# Patient Record
Sex: FEMALE | Race: WHITE | Employment: FULL TIME | URBAN - METROPOLITAN AREA
[De-identification: names, ages, dates, MRNs, and addresses within clinical notes are randomized per-mention and may not be internally consistent; named-entity substitution may affect disease eponyms.]

---

## 2017-02-10 PROBLEM — R53.83 FATIGUE, UNSPECIFIED TYPE: Status: ACTIVE | Noted: 2017-02-10

## 2017-02-10 PROBLEM — J45.30 MILD PERSISTENT ASTHMA WITHOUT COMPLICATION: Status: ACTIVE | Noted: 2017-02-10

## 2017-02-10 PROBLEM — E03.9 HYPOTHYROIDISM, UNSPECIFIED TYPE: Status: ACTIVE | Noted: 2017-02-10

## 2017-02-10 PROBLEM — E66.3 OVERWEIGHT: Status: ACTIVE | Noted: 2017-02-10

## 2017-07-24 PROBLEM — E78.5 DYSLIPIDEMIA: Status: ACTIVE | Noted: 2017-07-24

## 2017-07-24 PROBLEM — E55.9 VITAMIN D DEFICIENCY: Status: ACTIVE | Noted: 2017-07-24

## 2017-11-28 PROCEDURE — 87086 URINE CULTURE/COLONY COUNT: CPT | Performed by: OBSTETRICS & GYNECOLOGY

## 2017-11-28 PROCEDURE — 81001 URINALYSIS AUTO W/SCOPE: CPT | Performed by: OBSTETRICS & GYNECOLOGY

## 2018-07-16 PROBLEM — R04.2 BLOOD-TINGED SPUTUM: Status: ACTIVE | Noted: 2018-07-16

## 2018-07-16 PROBLEM — J20.9 ACUTE BRONCHITIS, UNSPECIFIED ORGANISM: Status: ACTIVE | Noted: 2018-07-16

## 2018-07-16 PROBLEM — R05.9 COUGH: Status: ACTIVE | Noted: 2018-07-16

## 2018-07-16 PROCEDURE — 86664 EPSTEIN-BARR NUCLEAR ANTIGEN: CPT | Performed by: FAMILY MEDICINE

## 2018-07-16 PROCEDURE — 86663 EPSTEIN-BARR ANTIBODY: CPT | Performed by: FAMILY MEDICINE

## 2018-07-16 PROCEDURE — 86665 EPSTEIN-BARR CAPSID VCA: CPT | Performed by: FAMILY MEDICINE

## 2018-11-30 PROCEDURE — 88175 CYTOPATH C/V AUTO FLUID REDO: CPT | Performed by: OBSTETRICS & GYNECOLOGY

## 2019-04-17 PROBLEM — J20.9 ACUTE BRONCHITIS, UNSPECIFIED ORGANISM: Status: RESOLVED | Noted: 2018-07-16 | Resolved: 2019-04-17

## 2019-04-17 PROBLEM — N92.1 MENORRHAGIA WITH IRREGULAR CYCLE: Status: ACTIVE | Noted: 2019-04-17

## 2019-04-17 PROBLEM — R04.2 BLOOD-TINGED SPUTUM: Status: RESOLVED | Noted: 2018-07-16 | Resolved: 2019-04-17

## 2019-04-17 PROBLEM — E66.09 CLASS 2 OBESITY DUE TO EXCESS CALORIES WITHOUT SERIOUS COMORBIDITY WITH BODY MASS INDEX (BMI) OF 38.0 TO 38.9 IN ADULT: Status: ACTIVE | Noted: 2019-04-17

## 2019-04-17 PROBLEM — R05.9 COUGH: Status: RESOLVED | Noted: 2018-07-16 | Resolved: 2019-04-17

## 2019-05-11 PROCEDURE — 81001 URINALYSIS AUTO W/SCOPE: CPT | Performed by: FAMILY MEDICINE

## 2019-07-08 PROBLEM — E07.9 THYROID DISEASE DURING PREGNANCY: Status: ACTIVE | Noted: 2019-07-08

## 2019-07-08 PROBLEM — E06.3 HYPOTHYROIDISM DUE TO HASHIMOTO'S THYROIDITIS: Status: ACTIVE | Noted: 2019-07-08

## 2019-07-08 PROBLEM — O99.280 THYROID DISEASE DURING PREGNANCY: Status: ACTIVE | Noted: 2019-07-08

## 2019-07-08 PROBLEM — E03.8 HYPOTHYROIDISM DUE TO HASHIMOTO'S THYROIDITIS: Status: ACTIVE | Noted: 2019-07-08

## 2019-09-06 PROCEDURE — 86901 BLOOD TYPING SEROLOGIC RH(D): CPT | Performed by: OBSTETRICS & GYNECOLOGY

## 2019-09-06 PROCEDURE — 86850 RBC ANTIBODY SCREEN: CPT | Performed by: OBSTETRICS & GYNECOLOGY

## 2019-09-06 PROCEDURE — 86900 BLOOD TYPING SEROLOGIC ABO: CPT | Performed by: OBSTETRICS & GYNECOLOGY

## 2019-09-15 PROCEDURE — 87086 URINE CULTURE/COLONY COUNT: CPT | Performed by: EMERGENCY MEDICINE

## 2019-10-14 PROBLEM — Z34.90 PREGNANCY: Status: ACTIVE | Noted: 2019-10-14

## 2019-10-28 ENCOUNTER — TELEPHONE (OUTPATIENT)
Dept: PERINATAL CARE | Facility: HOSPITAL | Age: 29
End: 2019-10-28

## 2019-10-30 NOTE — PROGRESS NOTES
Outpatient Maternal-Fetal Medicine Consultation    Dear Dr. Rosendo Abernathy,    Thank you for requesting ultrasound evaluation and maternal fetal medicine consultation on your patient Galileo Bailey.   As you are aware she is a 34year old female with a [place] pregna Wt (!) 334 lb (151.5 kg)   LMP 05/09/2018 (Exact Date)   BMI 53.91 kg/m²   General: alert and oriented,no acute distress  Abdomen: gravid, soft, non-tender  Extremities: non-tender, no edema    OBSTETRIC ULTRASOUND  The patient had a level II ultrasound to suboptimal  Heart: Visualized and normal appearance: cardiac location, left outflow tract, Ductal arch. Four-chamber view: suboptimal, appears normal but suboptimal views of the pulmonary veins.  Right outflow tract: suboptimal, appears normal. Aortic arc population, type 2 diabetes mellitus is one of the two most common medical complications of the obese . The increased risk of type 2 diabetes is primarily related to an exaggerated increase in insulin resistance in the obese state.  It is reasonable II ultrasound is advised for women with obesity. The risk of neural tube defects increased significantly with maternal weight.     The analysis found that overweight and obese pregnant women experienced significantly more stillbirths than normal weight wom range with low free thyroxine [T4]) should be treated with thyroid hormone (T4).  In addition, because maternal euthyroidism is potentially important for normal fetal cognitive development, it is suggested to treat pregnant women with subclinical hypothyroi echo  Weekly NST's at 36 weeks. · She was advised to limit gestational weight gain to between 5 and 15 pounds  · Monitor TSH each trimester  · Referral for a sleep study is advised based on her BMI. She denies any snoring.     Thank you for allowing me to

## 2019-11-06 ENCOUNTER — OFFICE VISIT (OUTPATIENT)
Dept: PERINATAL CARE | Facility: HOSPITAL | Age: 29
End: 2019-11-06
Attending: OBSTETRICS & GYNECOLOGY
Payer: COMMERCIAL

## 2019-11-06 VITALS
BODY MASS INDEX: 47.09 KG/M2 | RESPIRATION RATE: 20 BRPM | SYSTOLIC BLOOD PRESSURE: 129 MMHG | WEIGHT: 293 LBS | DIASTOLIC BLOOD PRESSURE: 88 MMHG | HEART RATE: 92 BPM | HEIGHT: 66 IN

## 2019-11-06 DIAGNOSIS — E03.8 HYPOTHYROIDISM DUE TO HASHIMOTO'S THYROIDITIS: ICD-10-CM

## 2019-11-06 DIAGNOSIS — E06.3 HYPOTHYROIDISM DUE TO HASHIMOTO'S THYROIDITIS: ICD-10-CM

## 2019-11-06 DIAGNOSIS — O09.612 HIGH-RISK PREGNANCY, YOUNG PRIMIGRAVIDA IN SECOND TRIMESTER: ICD-10-CM

## 2019-11-06 PROCEDURE — 76811 OB US DETAILED SNGL FETUS: CPT | Performed by: OBSTETRICS & GYNECOLOGY

## 2019-11-06 PROCEDURE — 99244 OFF/OP CNSLTJ NEW/EST MOD 40: CPT | Performed by: OBSTETRICS & GYNECOLOGY

## 2019-11-06 NOTE — PROGRESS NOTES
Pt seen in [facility] at 24 4/7 weeks gestation, ambulatory and accompanied by her father Glenn Spicer. (FOB at work). Pt reports + FM, Denies concerns or discomforts.

## 2019-12-09 ENCOUNTER — OFFICE VISIT (OUTPATIENT)
Dept: PERINATAL CARE | Facility: HOSPITAL | Age: 29
End: 2019-12-09
Attending: OBSTETRICS & GYNECOLOGY
Payer: COMMERCIAL

## 2019-12-09 VITALS
DIASTOLIC BLOOD PRESSURE: 70 MMHG | BODY MASS INDEX: 54 KG/M2 | HEART RATE: 109 BPM | WEIGHT: 293 LBS | SYSTOLIC BLOOD PRESSURE: 140 MMHG

## 2019-12-09 DIAGNOSIS — Z3A.29 29 WEEKS GESTATION OF PREGNANCY: ICD-10-CM

## 2019-12-09 DIAGNOSIS — E03.8 HYPOTHYROIDISM DUE TO HASHIMOTO'S THYROIDITIS: ICD-10-CM

## 2019-12-09 DIAGNOSIS — E06.3 HYPOTHYROIDISM DUE TO HASHIMOTO'S THYROIDITIS: ICD-10-CM

## 2019-12-09 PROCEDURE — 76827 ECHO EXAM OF FETAL HEART: CPT | Performed by: OBSTETRICS & GYNECOLOGY

## 2019-12-09 PROCEDURE — 76816 OB US FOLLOW-UP PER FETUS: CPT | Performed by: OBSTETRICS & GYNECOLOGY

## 2019-12-09 PROCEDURE — 76825 ECHO EXAM OF FETAL HEART: CPT | Performed by: OBSTETRICS & GYNECOLOGY

## 2019-12-09 PROCEDURE — 93325 DOPPLER ECHO COLOR FLOW MAPG: CPT | Performed by: OBSTETRICS & GYNECOLOGY

## 2019-12-09 PROCEDURE — 99214 OFFICE O/P EST MOD 30 MIN: CPT | Performed by: OBSTETRICS & GYNECOLOGY

## 2019-12-09 NOTE — PROGRESS NOTES
Indication: poor visualization anatomy. ____________________________________________________________________________  History: Age: 34 years.  : 1 Para: 0.  ____________________________________________________________________________  Dating:  LMP: arteries,:  Confluent, normal size  Ductal Arch:  suboptimal views  Aortic arch:  Normal  Rhythm:  Sinus    ____________________________________________________________________________  Comments:    Dear Dr. Lizette Lion,       Thank you for requesting  an Remy Mack undiagnosed pregestational diabetes in the first trimester.    Glucose intolerance associated with gestational diabetes generally resolves postpartum; however, obese women with a history of gestational diabetes have a two-fold increased prevalence of subseq kg)   LMP 05/09/2018 (Exact Date)   BMI 54.23 kg/m²   Alert and Oriented. No acute distress. Abdomen: soft, nontender      Ultrasound findings: The fetal measurements are consistent with the established EDC.   She understands that ultrasound exam canno

## 2020-01-06 ENCOUNTER — OFFICE VISIT (OUTPATIENT)
Dept: PERINATAL CARE | Facility: HOSPITAL | Age: 30
End: 2020-01-06
Attending: OBSTETRICS & GYNECOLOGY
Payer: COMMERCIAL

## 2020-01-06 VITALS
WEIGHT: 293 LBS | HEART RATE: 107 BPM | DIASTOLIC BLOOD PRESSURE: 64 MMHG | SYSTOLIC BLOOD PRESSURE: 148 MMHG | BODY MASS INDEX: 56 KG/M2

## 2020-01-06 DIAGNOSIS — O99.213 OBESITY AFFECTING PREGNANCY IN THIRD TRIMESTER: ICD-10-CM

## 2020-01-06 PROCEDURE — 76819 FETAL BIOPHYS PROFIL W/O NST: CPT | Performed by: OBSTETRICS & GYNECOLOGY

## 2020-01-06 PROCEDURE — 76816 OB US FOLLOW-UP PER FETUS: CPT | Performed by: OBSTETRICS & GYNECOLOGY

## 2020-01-06 PROCEDURE — 99213 OFFICE O/P EST LOW 20 MIN: CPT | Performed by: OBSTETRICS & GYNECOLOGY

## 2020-01-06 NOTE — PROGRESS NOTES
Indication: poor visualization anatomy. ____________________________________________________________________________  History: Age: 34 years.  : 1 Para: 0.  ____________________________________________________________________________  Dating:  LM Thank you for requesting  an ultrasound and consultation for   Criselda Borden   regarding obesity and hypothyroidism. Her prenatal records and labs were reviewed.           G1  Allergy - codeine, sulfa  PMH -  morbid obesity, asthma, hypothyroidism     PSH

## 2020-06-07 ENCOUNTER — HOSPITAL ENCOUNTER (EMERGENCY)
Facility: HOSPITAL | Age: 30
Discharge: HOME | End: 2020-06-07
Attending: EMERGENCY MEDICINE
Payer: COMMERCIAL

## 2020-06-07 ENCOUNTER — APPOINTMENT (OUTPATIENT)
Dept: GENERAL RADIOLOGY | Facility: HOSPITAL | Age: 30
End: 2020-06-07
Attending: EMERGENCY MEDICINE
Payer: COMMERCIAL

## 2020-06-07 VITALS
SYSTOLIC BLOOD PRESSURE: 110 MMHG | WEIGHT: 293 LBS | DIASTOLIC BLOOD PRESSURE: 42 MMHG | TEMPERATURE: 99 F | RESPIRATION RATE: 23 BRPM | BODY MASS INDEX: 48 KG/M2 | HEART RATE: 96 BPM | OXYGEN SATURATION: 100 %

## 2020-06-07 DIAGNOSIS — U07.1 COVID-19: Primary | ICD-10-CM

## 2020-06-07 PROCEDURE — 99285 EMERGENCY DEPT VISIT HI MDM: CPT

## 2020-06-07 PROCEDURE — 82728 ASSAY OF FERRITIN: CPT | Performed by: EMERGENCY MEDICINE

## 2020-06-07 PROCEDURE — 93005 ELECTROCARDIOGRAM TRACING: CPT

## 2020-06-07 PROCEDURE — 80053 COMPREHEN METABOLIC PANEL: CPT | Performed by: EMERGENCY MEDICINE

## 2020-06-07 PROCEDURE — 93010 ELECTROCARDIOGRAM REPORT: CPT

## 2020-06-07 PROCEDURE — 85025 COMPLETE CBC W/AUTO DIFF WBC: CPT | Performed by: EMERGENCY MEDICINE

## 2020-06-07 PROCEDURE — 84145 PROCALCITONIN (PCT): CPT | Performed by: EMERGENCY MEDICINE

## 2020-06-07 PROCEDURE — 96360 HYDRATION IV INFUSION INIT: CPT

## 2020-06-07 PROCEDURE — 85379 FIBRIN DEGRADATION QUANT: CPT | Performed by: EMERGENCY MEDICINE

## 2020-06-07 PROCEDURE — 71045 X-RAY EXAM CHEST 1 VIEW: CPT | Performed by: EMERGENCY MEDICINE

## 2020-06-07 PROCEDURE — 83615 LACTATE (LD) (LDH) ENZYME: CPT | Performed by: EMERGENCY MEDICINE

## 2020-06-07 PROCEDURE — 84484 ASSAY OF TROPONIN QUANT: CPT | Performed by: EMERGENCY MEDICINE

## 2020-06-07 RX ORDER — POTASSIUM CHLORIDE 20 MEQ/1
40 TABLET, EXTENDED RELEASE ORAL ONCE
Status: COMPLETED | OUTPATIENT
Start: 2020-06-07 | End: 2020-06-07

## 2020-06-07 NOTE — ED INITIAL ASSESSMENT (HPI)
Per patient, diagnosed with COVID almost 2 weeks ago and cough with shortness of breath is worse. AAO x 4.

## 2020-06-07 NOTE — ED PROVIDER NOTES
Patient Seen in: [facility] Emergency Department      History   Patient presents with:   Other    Stated Complaint: POSITIVE COVID AND SYMPTOMS ARE GETTING WORSE    HPI    The patient is a 46-year-old female with a history of asthma, works at [employer] systems are as noted in HPI. Constitutional and vital signs reviewed. All other systems reviewed and negative except as noted above.     Physical Exam     ED Triage Vitals [06/07/20 1106]   BP 97/52   Pulse 101   Resp (!) 28   Temp 98.6 °F (37 °C)   T Reviewed   COMP METABOLIC PANEL (14) - Abnormal; Notable for the following components:       Result Value    Potassium 3.2 (*)     Calcium, Total 7.8 (*)     AST 38 (*)     ALT 69 (*)     Albumin 3.0 (*)     A/G Ratio 0.7 (*)     All other components withi COMPARISON:  None. INDICATIONS:  POSITIVE COVID AND SYMPTOMS ARE GETTING WORSE         PATIENT STATED HISTORY: (As transcribed by Technologist)  COVID symptoms     getting worse. Asthma getting worse. SOB getting worse since yesterday. she is to follow-up with her physician as previously planned tomorrow. She felt comfortable with this plan and she is discharged home in stable condition.             Disposition and Plan     Clinical Impression:  EQUED-38  (primary encounter diagnosis)

## 2020-06-10 ENCOUNTER — HOSPITAL ENCOUNTER (INPATIENT)
Facility: HOSPITAL | Age: 30
LOS: 2 days | Discharge: HOME | DRG: 177 | End: 2020-06-12
Attending: EMERGENCY MEDICINE | Admitting: HOSPITALIST
Payer: COMMERCIAL

## 2020-06-10 ENCOUNTER — APPOINTMENT (OUTPATIENT)
Dept: GENERAL RADIOLOGY | Facility: HOSPITAL | Age: 30
DRG: 177 | End: 2020-06-10
Attending: EMERGENCY MEDICINE
Payer: COMMERCIAL

## 2020-06-10 DIAGNOSIS — J45.30 MILD PERSISTENT ASTHMA WITHOUT COMPLICATION: ICD-10-CM

## 2020-06-10 DIAGNOSIS — R09.02 HYPOXIA: ICD-10-CM

## 2020-06-10 DIAGNOSIS — U07.1 COVID-19 VIRUS INFECTION: Primary | ICD-10-CM

## 2020-06-10 PROCEDURE — 82728 ASSAY OF FERRITIN: CPT | Performed by: EMERGENCY MEDICINE

## 2020-06-10 PROCEDURE — 93010 ELECTROCARDIOGRAM REPORT: CPT | Performed by: INTERNAL MEDICINE

## 2020-06-10 PROCEDURE — 83880 ASSAY OF NATRIURETIC PEPTIDE: CPT | Performed by: EMERGENCY MEDICINE

## 2020-06-10 PROCEDURE — 93005 ELECTROCARDIOGRAM TRACING: CPT

## 2020-06-10 PROCEDURE — 86140 C-REACTIVE PROTEIN: CPT | Performed by: EMERGENCY MEDICINE

## 2020-06-10 PROCEDURE — 99285 EMERGENCY DEPT VISIT HI MDM: CPT

## 2020-06-10 PROCEDURE — 85379 FIBRIN DEGRADATION QUANT: CPT | Performed by: EMERGENCY MEDICINE

## 2020-06-10 PROCEDURE — 84145 PROCALCITONIN (PCT): CPT | Performed by: EMERGENCY MEDICINE

## 2020-06-10 PROCEDURE — 80053 COMPREHEN METABOLIC PANEL: CPT | Performed by: EMERGENCY MEDICINE

## 2020-06-10 PROCEDURE — 87040 BLOOD CULTURE FOR BACTERIA: CPT | Performed by: EMERGENCY MEDICINE

## 2020-06-10 PROCEDURE — 36415 COLL VENOUS BLD VENIPUNCTURE: CPT

## 2020-06-10 PROCEDURE — 85025 COMPLETE CBC W/AUTO DIFF WBC: CPT | Performed by: EMERGENCY MEDICINE

## 2020-06-10 PROCEDURE — 84484 ASSAY OF TROPONIN QUANT: CPT | Performed by: EMERGENCY MEDICINE

## 2020-06-10 PROCEDURE — 71045 X-RAY EXAM CHEST 1 VIEW: CPT | Performed by: EMERGENCY MEDICINE

## 2020-06-10 PROCEDURE — 83615 LACTATE (LD) (LDH) ENZYME: CPT | Performed by: EMERGENCY MEDICINE

## 2020-06-10 RX ORDER — METOCLOPRAMIDE HYDROCHLORIDE 5 MG/ML
10 INJECTION INTRAMUSCULAR; INTRAVENOUS EVERY 8 HOURS PRN
Status: DISCONTINUED | OUTPATIENT
Start: 2020-06-10 | End: 2020-06-12

## 2020-06-10 RX ORDER — ACETAMINOPHEN AND CODEINE PHOSPHATE 120; 12 MG/5ML; MG/5ML
0.35 SOLUTION ORAL DAILY
Status: DISCONTINUED | OUTPATIENT
Start: 2020-06-10 | End: 2020-06-11

## 2020-06-10 RX ORDER — MONTELUKAST SODIUM 10 MG/1
10 TABLET ORAL NIGHTLY
Status: DISCONTINUED | OUTPATIENT
Start: 2020-06-10 | End: 2020-06-12

## 2020-06-10 RX ORDER — ENOXAPARIN SODIUM 100 MG/ML
0.5 INJECTION SUBCUTANEOUS DAILY
Status: DISCONTINUED | OUTPATIENT
Start: 2020-06-10 | End: 2020-06-12

## 2020-06-10 RX ORDER — BISACODYL 10 MG
10 SUPPOSITORY, RECTAL RECTAL
Status: DISCONTINUED | OUTPATIENT
Start: 2020-06-10 | End: 2020-06-12

## 2020-06-10 RX ORDER — ALBUTEROL SULFATE 90 UG/1
2 AEROSOL, METERED RESPIRATORY (INHALATION) EVERY 4 HOURS PRN
Status: DISCONTINUED | OUTPATIENT
Start: 2020-06-10 | End: 2020-06-12

## 2020-06-10 RX ORDER — LEVOTHYROXINE SODIUM 0.2 MG/1
200 TABLET ORAL
Status: DISCONTINUED | OUTPATIENT
Start: 2020-06-10 | End: 2020-06-11

## 2020-06-10 RX ORDER — BENZONATATE 200 MG/1
200 CAPSULE ORAL 3 TIMES DAILY PRN
Status: DISCONTINUED | OUTPATIENT
Start: 2020-06-10 | End: 2020-06-11

## 2020-06-10 RX ORDER — ACETAMINOPHEN 325 MG/1
650 TABLET ORAL EVERY 6 HOURS PRN
Status: DISCONTINUED | OUTPATIENT
Start: 2020-06-10 | End: 2020-06-12

## 2020-06-10 RX ORDER — SODIUM PHOSPHATE, DIBASIC AND SODIUM PHOSPHATE, MONOBASIC 7; 19 G/133ML; G/133ML
1 ENEMA RECTAL ONCE AS NEEDED
Status: DISCONTINUED | OUTPATIENT
Start: 2020-06-10 | End: 2020-06-12

## 2020-06-10 RX ORDER — ONDANSETRON 2 MG/ML
4 INJECTION INTRAMUSCULAR; INTRAVENOUS EVERY 6 HOURS PRN
Status: DISCONTINUED | OUTPATIENT
Start: 2020-06-10 | End: 2020-06-12

## 2020-06-10 RX ORDER — PREDNISONE 20 MG/1
20 TABLET ORAL DAILY
Status: DISCONTINUED | OUTPATIENT
Start: 2020-06-11 | End: 2020-06-11

## 2020-06-10 RX ORDER — POLYETHYLENE GLYCOL 3350 17 G/17G
17 POWDER, FOR SOLUTION ORAL DAILY PRN
Status: DISCONTINUED | OUTPATIENT
Start: 2020-06-10 | End: 2020-06-12

## 2020-06-10 RX ORDER — LEVOTHYROXINE SODIUM 0.07 MG/1
75 TABLET ORAL
Status: DISCONTINUED | OUTPATIENT
Start: 2020-06-10 | End: 2020-06-11

## 2020-06-10 NOTE — ED PROVIDER NOTES
Patient Seen in: [facility] Emergency Department      History   Patient presents with:  Dyspnea PAUL SOB    Stated Complaint: Covid positive 12 days ago    HPI    Patient is a 17-year-old female who returns for shortness of breath.   She is a known as Wt (!) 137.2 kg   LMP 06/01/2020   SpO2 96%   BMI 48.81 kg/m²         Physical Exam      Constitutional: Awake, alert, age appearing, non-toxic  Head: Normocephalic and atraumatic. Eyes: EOM are normal. Pupils are equal, round, and reactive to light. BY PCR - Normal   CBC WITH DIFFERENTIAL WITH PLATELET    Narrative: The following orders were created for panel order CBC WITH DIFFERENTIAL WITH PLATELET.   Procedure                               Abnormality         Status                     ---------

## 2020-06-10 NOTE — ED INITIAL ASSESSMENT (HPI)
Pt reports since Sunday night noted shortness of breath that is worse w/ ambulation, reports this am she slept through the night and didn't wake up to take neb treatment so O2 saturation was 86%.      COVID positive x12 days; hx of asthma

## 2020-06-11 PROCEDURE — 86140 C-REACTIVE PROTEIN: CPT | Performed by: HOSPITALIST

## 2020-06-11 PROCEDURE — 94640 AIRWAY INHALATION TREATMENT: CPT

## 2020-06-11 PROCEDURE — 85025 COMPLETE CBC W/AUTO DIFF WBC: CPT | Performed by: HOSPITALIST

## 2020-06-11 PROCEDURE — 87493 C DIFF AMPLIFIED PROBE: CPT | Performed by: HOSPITALIST

## 2020-06-11 PROCEDURE — 83615 LACTATE (LD) (LDH) ENZYME: CPT | Performed by: HOSPITALIST

## 2020-06-11 PROCEDURE — 80053 COMPREHEN METABOLIC PANEL: CPT | Performed by: HOSPITALIST

## 2020-06-11 RX ORDER — LEVOTHYROXINE SODIUM 0.2 MG/1
200 TABLET ORAL NIGHTLY
Status: DISCONTINUED | OUTPATIENT
Start: 2020-06-11 | End: 2020-06-12

## 2020-06-11 RX ORDER — LEVOTHYROXINE SODIUM 0.07 MG/1
75 TABLET ORAL NIGHTLY
Status: DISCONTINUED | OUTPATIENT
Start: 2020-06-11 | End: 2020-06-12

## 2020-06-11 RX ORDER — ACETAMINOPHEN AND CODEINE PHOSPHATE 120; 12 MG/5ML; MG/5ML
0.35 SOLUTION ORAL NIGHTLY
Status: DISCONTINUED | OUTPATIENT
Start: 2020-06-11 | End: 2020-06-12

## 2020-06-11 RX ORDER — GUAIFENESIN 600 MG
600 TABLET, EXTENDED RELEASE 12 HR ORAL 2 TIMES DAILY
Status: DISCONTINUED | OUTPATIENT
Start: 2020-06-11 | End: 2020-06-12

## 2020-06-11 RX ORDER — BENZONATATE 200 MG/1
200 CAPSULE ORAL 3 TIMES DAILY
Status: DISCONTINUED | OUTPATIENT
Start: 2020-06-11 | End: 2020-06-12

## 2020-06-11 NOTE — DIETARY NOTE
[address]     Admitting diagnosis:  Hypoxia [R09.02]  COVID-19 virus infection [U07.1]    Ht: 167.6 cm (5' 6\")  Wt: (!) 136.4 kg (300 lb 12.8 oz). This is 231% of IBW  Body mass index is 48.55 kg/m².   IBW: 59 kg

## 2020-06-11 NOTE — H&P
[facility] Hospitalist H&P       CC: sob, hypoxia    PCP: Lucita Gamboa MD    History of Present Illness: Pt is a 33 yo with asthma, Hashimoto's thyroiditis, morbid obesity, recent diagnosis of sars-cov-2 end of May, is admitted for sob, hypoxia.   A to 6 hours as needed for Wheezing., Disp: 8.5 g, Rfl: 1  cefdinir 300 MG Oral Cap, Take 1 capsule (300 mg total) by mouth 2 (two) times daily. , Disp: 20 capsule, Rfl: 0  benzonatate 200 MG Oral Cap, Take 1 capsule (200 mg total) by mouth 3 (three) times da no overt hernias   Extremities: Extremities normal, atraumatic, no cyanosis or edema. Skin: Skin color, texture, turgor normal. No visible rashes.      Neurologic:  Psychiatric: No focal deficits  appropriate affect,  memory intact     Diagnostic Data: ddimer and trop unremarkable. . Pct unremarkable. BC pending  -on empiric abx thought doubt superimposed bacterial pneumonia given unremarkable pct  -pulmonary consult, appreciate    **asthma- was taking nebs and po prednisone as outpatient.   On MDI

## 2020-06-11 NOTE — CONSULTS
Pulmonary H&P/Consult       NAME: Jayde Sandra - ROOM: [address] [place] [address] MRN: [id number] - Age: 34year old - :  12/3/1990    Date of Admission: 6/10/2020  3:02 PM  Admission Diagnosis: Hypoxia [R09.02]  COVID-19 virus infection [U07.1]  Reason for consult: d time  predniSONE 20 MG Oral Tab, Take 1 tablet (20 mg total) by mouth daily for 7 days. , Disp: 7 tablet, Rfl: 0, 6/10/2020 at Unknown time  Albuterol Sulfate HFA (PROAIR HFA) 108 (90 Base) MCG/ACT Inhalation Aero Soln, Inhale 2 puffs into the lungs every 4 Financial resource strain: Not on file      Food insecurity:        Worry: Not on file        Inability: Not on file      Transportation needs:        Medical: Not on file        Non-medical: Not on file    Tobacco Use      Smoking status: Never Smoker by mouth 2 (two) times daily. , Disp: 20 capsule, Rfl: 0  benzonatate 200 MG Oral Cap, Take 1 capsule (200 mg total) by mouth 3 (three) times daily as needed for cough. , Disp: 30 capsule, Rfl: 0  Montelukast Sodium 10 MG Oral Tab, Take 1 tablet (10 mg total °C) 97.5 °F (36.4 °C)   TempSrc:  Oral Oral Oral   SpO2:  96% 95% 99%   Weight:   (!) 300 lb 12.8 oz (136.4 kg)    Height:           Oxygen Therapy  SpO2: 99 %  O2 Device: None (Room air)  Pulse Oximetry Type: Continuous  Oximetry Probe Site Changed:  Yes asthma, covid pna  -monitor sats  -IS to bedside  2. Asthma  -cont MDI  -start Breo  -cont singulair  -stop steroids  3.  Covid PNA  -not requiring O2 at rest  -hold on actemra  -outside the window for remdesivir  -cont to self prone  -encouraged IS  -ok to

## 2020-06-11 NOTE — PROGRESS NOTES
Pt just arrived from [facility], she is alert, oriented x4, very pleasant. RA.C/o SOB, denies pain.  Report given to next shift

## 2020-06-11 NOTE — PLAN OF CARE
Problem: Patient/Family Goals  Goal: Patient/Family Long Term Goal  Description  Patient's Long Term Goal: to discharge with adequate resources    Interventions:  - Participate in and comply with medical treatment plan  - See additional Care Plan goals f Manage/alleviate anxiety  - Monitor for signs/symptoms of CO2 retention  Outcome: Progressing     Problem: GASTROINTESTINAL - ADULT  Goal: Minimal or absence of nausea and vomiting  Description  INTERVENTIONS:  - Maintain adequate hydration with IV or PO a breathe and IS encouraged. Pt reports frequent use of inhalers at home, respiratory mentioned possible benefit from nebs. Notified MD of respiratory recommendation and advised to wait for further eval tomorrow. Afebrile, VSS, NSR/SB on tele.   Refused lo

## 2020-06-11 NOTE — PROGRESS NOTES
NURSING ADMISSION NOTE      Patient admitted via Cart  Oriented to room. Safety precautions initiated. Bed in low position. Call light in reach. Pt oriented to room, admission navigator completed.

## 2020-06-11 NOTE — PROGRESS NOTES
[facility] Pharmacy Note:  Anticoagulation Weight Dose Adjustment for enoxaparin (LOVENOX)    Matt Shaikh is a 34year old female who has been prescribed enoxaparin (LOVENOX) 40 mg every 24 hours.       Estimated Creatinine Clearance: 114.6 mL/min (based on SCr

## 2020-06-11 NOTE — PAYOR COMM NOTE
--------------  ADMISSION REVIEW     Payor: [employer] [place] [employer]  Subscriber #:  [id number]  Authorization Number: [id number]    Admit date: 6/10/20  Admit time: [address]       Admitting Physician: Joann Horne MD  Attending Physician:  Maddison Gupta Alcohol use: Never      Alcohol/week: 0.0 standard drinks      Frequency: Never      Comment: Social    Drug use: No             Review of Systems    Positive for stated complaint: Covid positive 12 days ago  Other systems are as noted in HPI.   Constitut All other components within normal limits   PRO BETA NATRIURETIC PEPTIDE - Abnormal; Notable for the following components:    Pro-Beta Natriuretic Peptide 287 (*)     All other components within normal limits   RBC MORPHOLOGY SCAN - Abnormal; Notable for COVID-19 virus infection  (primary encounter diagnosis)  Hypoxia    Disposition:  Admit  6/10/2020  4:37 pm    Follow-up:  No follow-up provider specified.         Medications Prescribed:  Current Discharge Medication List                       Present on A gestational   • Psoriasis         PSH  Past Surgical History:   Procedure Laterality Date   •      • OTHER SURGICAL HISTORY  2020     Section        ALL:    Other                   SWELLING    Comment:steel  Codeine History reviewed. No pertinent family history. Review of Systems  Comprehensive ROS reviewed and negative except for what's stated above. Including negative for abdominal pain, syncope.        OBJECTIVE:  /83 (BP Location: Left arm)   Pulse 62   T 1204 06/10/20  1657   TROP <0.045 <0.045 <0.045       Additional Diagnostics: ECG: NSR HR 78    CXR: image personally reviewed agree with [id number] read    Radiology: Xr Chest Pa + Lat Chest (cpt=71046)              Xr Chest Ap Portable  (cpt=71045) Author: Alessandro Elizabeth MD Service: Pulmonology Author Type: Physician   Filed: 6/11/2020 12:00 PM Date of Service: 6/11/2020 11:31 AM Status: Signed   : Alessandro Elizabeth MD (Physician)   Consult Orders   1.  Consult to Pulmonology [[id number]    Section      sodium Chloride 0.9 % Inhalation Nebu Soln, MIX WITH LEVALBUTEROL AND NEBULIZER Q 4 H UTD, Disp: , Rfl: , 6/10/2020 at Unknown time  Levalbuterol HCl 1.25 MG/3ML Inhalation Nebu Soln, Take 3 mL (1.25 mg total) by nebulization every Adhesive Tape           RASH    Comment:Takes skin off     Social History:  Social History    Socioeconomic History      Marital status: Single      Spouse name: Not on file      Number of children: Not on file      Years of education: Not on file      Hig Levalbuterol HCl 1.25 MG/3ML Inhalation Nebu Soln, Take 3 mL (1.25 mg total) by nebulization every 4 (four) hours as needed for Wheezing., Disp: 30 each, Rfl: 1  predniSONE 20 MG Oral Tab, Take 1 tablet (20 mg total) by mouth daily for 7 days. , Disp: 7 tab CARDIOVASCULAR:  denies current chest pain   GI:  denies abdominal pain  :  denies dysuria or changes in stream   MUSCULOSKELETAL:  denies back pain   NEURO:  denies headaches, no strokes or seizure history   PSYCHE:  denies depression or anxiety   HEMAT Heart:    Regular rate and rhythm, S1 and S2 normal, no murmur, rub   or gallop   Abdomen:     Soft, non-tender, bowel sounds active all four quadrants,     no masses, no organomegaly   Extremities:   Extremities normal, atraumatic, no cyanosis or edema

## 2020-06-11 NOTE — PLAN OF CARE
Pt is AOx4. On room air, . Tele, NSR. VSS, afebrile. Up independently. IS encouraged, max 1500. Scheduled tessalon. Pt stating breathing feels improved after first dose of Breo. Pt updated on plan of care. Will continue to monitor.      Problem: Patient/ Encourage broncho-pulmonary hygiene including cough, deep breathe, Incentive Spirometry  - Assess the need for suctioning and perform as needed  - Assess and instruct to report SOB or any respiratory difficulty  - Respiratory Therapy support as indicated Monitor I&O, WT and lab values  - Obtain nutritional consult as needed  - Evaluate psychosocial factors contributing to over-consumption  Outcome: Progressing

## 2020-06-12 VITALS
BODY MASS INDEX: 47.09 KG/M2 | HEART RATE: 76 BPM | WEIGHT: 293 LBS | SYSTOLIC BLOOD PRESSURE: 127 MMHG | DIASTOLIC BLOOD PRESSURE: 75 MMHG | HEIGHT: 66 IN | RESPIRATION RATE: 20 BRPM | TEMPERATURE: 99 F | OXYGEN SATURATION: 99 %

## 2020-06-12 DIAGNOSIS — J45.30 MILD PERSISTENT ASTHMA WITHOUT COMPLICATION: ICD-10-CM

## 2020-06-12 PROCEDURE — 86140 C-REACTIVE PROTEIN: CPT | Performed by: HOSPITALIST

## 2020-06-12 PROCEDURE — 83615 LACTATE (LD) (LDH) ENZYME: CPT | Performed by: HOSPITALIST

## 2020-06-12 PROCEDURE — 85025 COMPLETE CBC W/AUTO DIFF WBC: CPT | Performed by: HOSPITALIST

## 2020-06-12 RX ORDER — MONTELUKAST SODIUM 10 MG/1
10 TABLET ORAL NIGHTLY
Qty: 30 TABLET | Refills: 0 | Status: SHIPPED | OUTPATIENT
Start: 2020-06-12 | End: 2020-06-12

## 2020-06-12 RX ORDER — MONTELUKAST SODIUM 10 MG/1
TABLET ORAL
Qty: 90 TABLET | Refills: 3 | Status: SHIPPED | OUTPATIENT
Start: 2020-06-12 | End: 2020-09-21

## 2020-06-12 RX ORDER — LEVALBUTEROL INHALATION SOLUTION 1.25 MG/3ML
1.25 SOLUTION RESPIRATORY (INHALATION) EVERY 4 HOURS PRN
Qty: 50 EACH | Refills: 0 | Status: SHIPPED | OUTPATIENT
Start: 2020-06-12 | End: 2020-09-21

## 2020-06-12 NOTE — PROGRESS NOTES
[address] Patient Status:  Inpatient    12/3/1990 MRN [id number]   [facility] 5NW-A Attending Laterll Devlin, *   Hosp Day # 2 PCP Karly Nguyen MD     SUBJECTIVE: Pt states that SOB is improved with th Exam:                          General: obese, alert, cooperative, in NAD                          HEENT: oropharynx clear without erythema or exudates, moist mucous membranes                          Lungs: Clear to auscultation bilaterally, no wheezes or singulair  3. Covid PNA  -not requiring O2  -inflammatory markers are downtrending   -hold on actemra  -outside the window for remdesivir  -cont to self prone  -encouraged IS  -monitoring off Abx  4.  Dispo  -stable from pulm perspective for discharge, expl

## 2020-06-12 NOTE — DISCHARGE SUMMARY
General Medicine Discharge Summary     Patient ID:  Iris Bernheim  34year old  [id number]  12/3/1990    Admit date: 6/10/2020    Discharge date and time:  6/12/20    Attending Physician: Harrison Fam, *     Primary Care Physician: Apple Quispe STUDY: July 16, 2018 radiograph. TECHNIQUE: PA and lateral, 2 views. FINDINGS:  The cardiomediastinal silhouette is normal in size and shape.  Subtle asymmetric perihilar infiltrate is suspected which was not seen on the 2018 exam. Additional faint opacity infiltration in the left upper lobe and right lung base laterally. Findings are consistent with progressive COVID-19 pneumonia. No pneumothorax. No effusion. CARDIAC:  Normal heart size and vascularity.  MEDIASTINUM:  Normal. HERACLIO: lungs daily. CONTINUE these medications which have CHANGED    Levalbuterol HCl 1.25 MG/3ML Inhalation Nebu Soln  Take 3 mL (1.25 mg total) by nebulization every 4 (four) hours as needed for Wheezing.     Montelukast Sodium 10 MG Oral Tab  Take 1 tablet Hospitalist  Pager [phone]

## 2020-06-12 NOTE — PLAN OF CARE
Problem: Patient/Family Goals  Goal: Patient/Family Long Term Goal  Description  Patient's Long Term Goal: to discharge with adequate resources    Interventions:  - Participate in and comply with medical treatment plan  - See additional Care Plan goals f respiratory difficulty  - Respiratory Therapy support as indicated  - Manage/alleviate anxiety  - Monitor for signs/symptoms of CO2 retention  Outcome: Progressing     Problem: GASTROINTESTINAL - ADULT  Goal: Minimal or absence of nausea and vomiting  Desc sats within defined limits on room air, demonstrated IS and encourage cough/deep breathe. O2 walk this evening, see note. Afebrile, VSS, NSR on tele. Pt voids independently. Tolerating diet, no acute events at this time.   Call light within reach, will

## 2020-06-12 NOTE — PAYOR COMM NOTE
--------------  CONTINUED STAY REVIEW----REQUESTING ADDITIONAL DAY 6/12      Payor: [employer] ELLIS  Subscriber #:  [id number]  Authorization Number: [id number]    Admit date: 6/10/20  Admit time: [address]    Admitting Physician: Cesar Valdovinos,  [phone]   K 3.97 3.2* 3.3* 4.3    108 110 110   CO2 25.3 25.0 27.0 28.0   BUN 9.0 8 7 6*   CREATSERUM 0.68 0.78 0.69 0.48*   CA 8.8 7.8* 8.6 8.3*   * 98 88 90                  Recent Labs   Lab 06/05/20  1111 06/05/20  1112 06/07/20 **asthma- was taking nebs and po prednisone as outpatient. breo added. Steroids discontinued     **Hashimoto's thyroiditis  -Stable. Continue home meds     **morbid obesity- encourage weight loss.  F/u with PCP     **PPx-lovenox     Dispo: once ok with pulm •  PEG 3350 (MIRALAX) powder packet 17 g, 17 g, Oral, Daily PRN  •  magnesium hydroxide (MILK OF MAGNESIA) 400 MG/5ML suspension 30 mL, 30 mL, Oral, Daily PRN  •  bisacodyl (DULCOLAX) rectal suppository 10 mg, 10 mg, Rectal, Daily PRN  •  Fleet Enema (FLEE Creatinine Kinase      Recent Labs   Lab 06/05/20  1111   CK 55         Inflammatory Markers           Recent Labs   Lab 06/07/20  1203   06/07/20  1204 06/10/20  1657 06/11/20  0513 06/12/20  0522   CRP  --   --   --  7.21* 6.12* 4.23*   [place]  --   --  60. Levothyroxine Sodium (SYNTHROID) tab 200 mcg     Date Action Dose Route User    6/11/2020 2016 Given 200 mcg Oral Agnes Covert, RN      Levothyroxine Sodium tab 75 mcg     Date Action Dose Route User    6/11/2020 2016 Given 75 mcg Oral Agnes Bradfordt, RN

## 2020-06-12 NOTE — PROGRESS NOTES
06/11/20 2150   Mobility   O2 walk?  Yes   SPO2 on Room Air at Rest 97   SPO2 Ambulation on Room Air 91   Ambulation oxygen flow (liters per minute) 0       SPO2% ON ROOM AIR 97    SPO2% AMBULATION ON ROOM AIR 91

## 2020-06-12 NOTE — PROGRESS NOTES
Pt is a 28y/o female admitted with dyspnea due to COVID alert oriented. on room air.02 sats>93%. 02 walk done,pt didn't need 02.encouraged IS and self proning. denies fever,pain,sob,n/v or diarrhea. inflamatroy markers trending down. cough,tessalon given with m

## 2020-06-12 NOTE — PROGRESS NOTES
[facility] Hospitalist Progress Note     PCP: Zhao Masterson MD    CC:  Follow up    SUBJECTIVE:  Sitting up in bed, on RA. Had O2 walk yesterday evening, POX dipped to 90% with ambulation.  Feels breo helped loosen things up    OBJECTIVE:  Temp:  [98 °F ( TROP <0.045 <0.045 <0.045       Meds:     • Levothyroxine Sodium  200 mcg Oral Nightly   • Levothyroxine Sodium  75 mcg Oral Nightly   • Norethindrone  0.35 mg Oral Nightly   • benzonatate  200 mg Oral TID   • Fluticasone Furoate-Vilanterol  1 puff Inhal

## 2020-06-12 NOTE — PROGRESS NOTES
NURSING DISCHARGE NOTE    Discharged Home via Wheelchair. Accompanied by Family member and Support staff  Belongings Taken by patient/family. Pt discharged to home. discharge instruction given to pt,verbalized understanding. PIV REMOVED. PTS FAMILY TAKE

## 2020-06-15 NOTE — PAYOR COMM NOTE
--------------  DISCHARGE REVIEW    [facility]  Subscriber #:  [id number]  Authorization Number: [id number]    Admit date: 6/10/20  Admit time:  1840  Discharge Date: 6/12/2020  4:01 PM     Admitting Physician: Shelli Snyder MD markers overall unremarkable/downtrending. . Pct unremarkable. BC NG  -s/p empiric abx as doubt superimposed bacterial pneumonia given unremarkable pct  -pulmonary consult, appreciate.  Not requiring O2  On exertion now     **asthma- was taking nebs calvarium is intact. Visualized paranasal sinuses and mastoid air cells are well aerated. IMPRESSION: No acute intracranial abnormality.     Xr Chest Ap Portable  (cpt=71045)    Result Date: 6/10/2020  PROCEDURE:  XR CHEST AP PORTABLE  (CPT=71045)  TECHN size with normal pulmonary vascularity.   CONCLUSION:  There are subtle patchy ground-glass opacities and interstitial opacities in the mid to lower left lung and lower right lung that may be related to atypical pneumonia from COVID-19 infection, with other Activity: activity as tolerated  Diet: as directed  Wound Care: none needed  Code Status: No Order  O2: prn    Follow-up with          Follow up with Tex Presley MD in 1 week(s)   pulmonary follow up - via telemedicine visit  [id number]

## 2020-09-21 PROBLEM — O99.280 THYROID DISEASE DURING PREGNANCY: Status: RESOLVED | Noted: 2019-07-08 | Resolved: 2020-09-21

## 2020-09-21 PROBLEM — U07.1 COVID-19 VIRUS INFECTION: Status: RESOLVED | Noted: 2020-06-10 | Resolved: 2020-09-21

## 2020-09-21 PROBLEM — O03.9 MISCARRIAGE: Status: ACTIVE | Noted: 2020-09-21

## 2020-09-21 PROBLEM — E07.9 THYROID DISEASE DURING PREGNANCY: Status: RESOLVED | Noted: 2019-07-08 | Resolved: 2020-09-21

## 2020-09-21 PROBLEM — Z86.16 HISTORY OF 2019 NOVEL CORONAVIRUS DISEASE (COVID-19): Status: ACTIVE | Noted: 2020-09-21

## 2020-09-21 PROBLEM — R09.02 HYPOXIA: Status: RESOLVED | Noted: 2020-06-10 | Resolved: 2020-09-21

## 2020-10-12 PROBLEM — M25.461 EFFUSION OF RIGHT KNEE: Status: ACTIVE | Noted: 2020-10-12
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
## 2020-10-12 PROBLEM — M25.561 ACUTE PAIN OF RIGHT KNEE: Status: ACTIVE | Noted: 2020-10-12

## 2020-10-12 PROBLEM — R29.898 POPPING SOUND OF KNEE JOINT: Status: ACTIVE | Noted: 2020-10-12

## 2021-05-11 PROBLEM — J30.1 ALLERGIC RHINITIS DUE TO POLLEN, UNSPECIFIED SEASONALITY: Status: ACTIVE | Noted: 2021-05-11

## 2021-05-11 PROBLEM — R53.83 OTHER FATIGUE: Status: ACTIVE | Noted: 2021-05-11

## 2021-06-25 PROBLEM — R11.2 NAUSEA AND VOMITING, INTRACTABILITY OF VOMITING NOT SPECIFIED, UNSPECIFIED VOMITING TYPE: Status: ACTIVE | Noted: 2021-06-25

## 2021-06-25 PROBLEM — R10.9 ABDOMINAL PAIN, UNSPECIFIED ABDOMINAL LOCATION: Status: ACTIVE | Noted: 2021-06-25

## 2021-06-25 PROBLEM — R19.7 DIARRHEA OF PRESUMED INFECTIOUS ORIGIN: Status: ACTIVE | Noted: 2021-06-25

## 2021-06-25 PROBLEM — R14.0 ABDOMINAL BLOATING: Status: ACTIVE | Noted: 2021-06-25

## 2021-12-02 PROBLEM — J04.0 LARYNGITIS: Status: ACTIVE | Noted: 2021-12-02

## 2021-12-02 PROBLEM — R50.9 FEVER, UNSPECIFIED FEVER CAUSE: Status: ACTIVE | Noted: 2021-12-02

## 2021-12-02 PROBLEM — R10.9 ABDOMINAL PAIN, UNSPECIFIED ABDOMINAL LOCATION: Status: RESOLVED | Noted: 2021-06-25 | Resolved: 2021-12-02

## 2021-12-02 PROBLEM — R09.81 NASAL CONGESTION: Status: ACTIVE | Noted: 2021-12-02

## 2021-12-02 PROBLEM — R14.0 ABDOMINAL BLOATING: Status: RESOLVED | Noted: 2021-06-25 | Resolved: 2021-12-02

## 2021-12-02 PROBLEM — R11.2 NAUSEA AND VOMITING, INTRACTABILITY OF VOMITING NOT SPECIFIED, UNSPECIFIED VOMITING TYPE: Status: RESOLVED | Noted: 2021-06-25 | Resolved: 2021-12-02

## 2021-12-02 PROBLEM — R19.7 DIARRHEA, UNSPECIFIED TYPE: Status: ACTIVE | Noted: 2021-06-25

## 2021-12-02 PROBLEM — R19.7 DIARRHEA OF PRESUMED INFECTIOUS ORIGIN: Status: RESOLVED | Noted: 2021-06-25 | Resolved: 2021-12-02

## 2021-12-02 PROBLEM — J02.9 SORE THROAT: Status: ACTIVE | Noted: 2021-12-02

## 2022-01-17 PROBLEM — R09.81 NASAL CONGESTION: Status: RESOLVED | Noted: 2021-12-02 | Resolved: 2022-01-17

## 2022-01-17 PROBLEM — M84.375D STRESS FRACTURE OF METATARSAL BONE OF LEFT FOOT WITH ROUTINE HEALING: Status: ACTIVE | Noted: 2022-01-17

## 2022-01-17 PROBLEM — J04.0 LARYNGITIS: Status: RESOLVED | Noted: 2021-12-02 | Resolved: 2022-01-17

## 2022-01-17 PROBLEM — R19.7 DIARRHEA, UNSPECIFIED TYPE: Status: RESOLVED | Noted: 2021-06-25 | Resolved: 2022-01-17

## 2022-01-17 PROBLEM — J02.9 SORE THROAT: Status: RESOLVED | Noted: 2021-12-02 | Resolved: 2022-01-17

## 2022-01-17 PROBLEM — R50.9 FEVER, UNSPECIFIED FEVER CAUSE: Status: RESOLVED | Noted: 2021-12-02 | Resolved: 2022-01-17

## 2022-01-25 PROBLEM — N63.10 MASS OF RIGHT BREAST: Status: ACTIVE | Noted: 2022-01-25
